# Patient Record
Sex: FEMALE | Race: WHITE | Employment: OTHER | URBAN - METROPOLITAN AREA
[De-identification: names, ages, dates, MRNs, and addresses within clinical notes are randomized per-mention and may not be internally consistent; named-entity substitution may affect disease eponyms.]

---

## 2022-07-09 ENCOUNTER — APPOINTMENT (OUTPATIENT)
Dept: CT IMAGING | Age: 27
End: 2022-07-09
Payer: MEDICARE

## 2022-07-09 ENCOUNTER — HOSPITAL ENCOUNTER (EMERGENCY)
Age: 27
Discharge: HOME | End: 2022-07-09
Attending: EMERGENCY MEDICINE
Payer: MEDICARE

## 2022-07-09 VITALS
TEMPERATURE: 97.2 F | HEIGHT: 62 IN | BODY MASS INDEX: 24.84 KG/M2 | OXYGEN SATURATION: 100 % | RESPIRATION RATE: 20 BRPM | DIASTOLIC BLOOD PRESSURE: 81 MMHG | HEART RATE: 98 BPM | SYSTOLIC BLOOD PRESSURE: 121 MMHG | WEIGHT: 135 LBS

## 2022-07-09 DIAGNOSIS — R56.9 SEIZURE (HCC): ICD-10-CM

## 2022-07-09 DIAGNOSIS — S01.01XA LACERATION OF SCALP, INITIAL ENCOUNTER: Primary | ICD-10-CM

## 2022-07-09 LAB
A/G RATIO: 1.7 (ref 1.1–2.2)
ALBUMIN SERPL-MCNC: 4.7 G/DL (ref 3.4–5)
ALP BLD-CCNC: 65 U/L (ref 40–129)
ALT SERPL-CCNC: 14 U/L (ref 10–40)
ANION GAP SERPL CALCULATED.3IONS-SCNC: 9 MMOL/L (ref 3–16)
AST SERPL-CCNC: 28 U/L (ref 15–37)
BASOPHILS ABSOLUTE: 0 K/UL (ref 0–0.2)
BASOPHILS RELATIVE PERCENT: 0.7 %
BILIRUB SERPL-MCNC: <0.2 MG/DL (ref 0–1)
BUN BLDV-MCNC: 16 MG/DL (ref 7–20)
CALCIUM SERPL-MCNC: 10.3 MG/DL (ref 8.3–10.6)
CHLORIDE BLD-SCNC: 104 MMOL/L (ref 99–110)
CO2: 30 MMOL/L (ref 21–32)
CREAT SERPL-MCNC: 0.5 MG/DL (ref 0.6–1.1)
EOSINOPHILS ABSOLUTE: 0.3 K/UL (ref 0–0.6)
EOSINOPHILS RELATIVE PERCENT: 4.9 %
GFR AFRICAN AMERICAN: >60
GFR NON-AFRICAN AMERICAN: >60
GLUCOSE BLD-MCNC: 109 MG/DL (ref 70–99)
HCT VFR BLD CALC: 39.8 % (ref 36–48)
HEMOGLOBIN: 12.8 G/DL (ref 12–16)
LYMPHOCYTES ABSOLUTE: 2 K/UL (ref 1–5.1)
LYMPHOCYTES RELATIVE PERCENT: 37.3 %
MCH RBC QN AUTO: 29.6 PG (ref 26–34)
MCHC RBC AUTO-ENTMCNC: 32.2 G/DL (ref 31–36)
MCV RBC AUTO: 92.2 FL (ref 80–100)
MONOCYTES ABSOLUTE: 0.5 K/UL (ref 0–1.3)
MONOCYTES RELATIVE PERCENT: 10.1 %
NEUTROPHILS ABSOLUTE: 2.5 K/UL (ref 1.7–7.7)
NEUTROPHILS RELATIVE PERCENT: 47 %
PDW BLD-RTO: 14.3 % (ref 12.4–15.4)
PLATELET # BLD: 190 K/UL (ref 135–450)
PMV BLD AUTO: 9.1 FL (ref 5–10.5)
POTASSIUM SERPL-SCNC: 4.3 MMOL/L (ref 3.5–5.1)
RBC # BLD: 4.32 M/UL (ref 4–5.2)
SODIUM BLD-SCNC: 143 MMOL/L (ref 136–145)
TOTAL PROTEIN: 7.4 G/DL (ref 6.4–8.2)
VALPROIC ACID LEVEL: 43.8 UG/ML (ref 50–100)
WBC # BLD: 5.4 K/UL (ref 4–11)

## 2022-07-09 PROCEDURE — 12004 RPR S/N/AX/GEN/TRK7.6-12.5CM: CPT

## 2022-07-09 PROCEDURE — 90715 TDAP VACCINE 7 YRS/> IM: CPT | Performed by: EMERGENCY MEDICINE

## 2022-07-09 PROCEDURE — 96372 THER/PROPH/DIAG INJ SC/IM: CPT

## 2022-07-09 PROCEDURE — 72125 CT NECK SPINE W/O DYE: CPT

## 2022-07-09 PROCEDURE — 80053 COMPREHEN METABOLIC PANEL: CPT

## 2022-07-09 PROCEDURE — 6360000002 HC RX W HCPCS: Performed by: EMERGENCY MEDICINE

## 2022-07-09 PROCEDURE — 80164 ASSAY DIPROPYLACETIC ACD TOT: CPT

## 2022-07-09 PROCEDURE — 70450 CT HEAD/BRAIN W/O DYE: CPT

## 2022-07-09 PROCEDURE — 2500000003 HC RX 250 WO HCPCS

## 2022-07-09 PROCEDURE — 99285 EMERGENCY DEPT VISIT HI MDM: CPT

## 2022-07-09 PROCEDURE — 90471 IMMUNIZATION ADMIN: CPT | Performed by: EMERGENCY MEDICINE

## 2022-07-09 PROCEDURE — 85025 COMPLETE CBC W/AUTO DIFF WBC: CPT

## 2022-07-09 RX ORDER — RISPERIDONE 2 MG/1
2 TABLET, FILM COATED ORAL 2 TIMES DAILY
COMMUNITY

## 2022-07-09 RX ORDER — KETAMINE HYDROCHLORIDE 50 MG/ML
5 INJECTION, SOLUTION, CONCENTRATE INTRAMUSCULAR; INTRAVENOUS ONCE
Status: COMPLETED | OUTPATIENT
Start: 2022-07-09 | End: 2022-07-09

## 2022-07-09 RX ORDER — HALOPERIDOL 5 MG/ML
5 INJECTION INTRAMUSCULAR ONCE
Status: COMPLETED | OUTPATIENT
Start: 2022-07-09 | End: 2022-07-09

## 2022-07-09 RX ORDER — AMITRIPTYLINE HYDROCHLORIDE 100 MG/1
50 TABLET, FILM COATED ORAL NIGHTLY
COMMUNITY
Start: 2022-04-16

## 2022-07-09 RX ORDER — KETAMINE HYDROCHLORIDE 50 MG/ML
INJECTION, SOLUTION, CONCENTRATE INTRAMUSCULAR; INTRAVENOUS
Status: COMPLETED
Start: 2022-07-09 | End: 2022-07-09

## 2022-07-09 RX ORDER — DIVALPROEX SODIUM 125 MG/1
725 CAPSULE, COATED PELLETS ORAL 2 TIMES DAILY
COMMUNITY

## 2022-07-09 RX ORDER — MIDAZOLAM HYDROCHLORIDE 5 MG/ML
10 INJECTION INTRAMUSCULAR; INTRAVENOUS ONCE
Status: COMPLETED | OUTPATIENT
Start: 2022-07-09 | End: 2022-07-09

## 2022-07-09 RX ORDER — ETHOSUXIMIDE 250 MG/5ML
20 SOLUTION ORAL 2 TIMES DAILY
COMMUNITY

## 2022-07-09 RX ORDER — LIDOCAINE HYDROCHLORIDE AND EPINEPHRINE BITARTRATE 20; .01 MG/ML; MG/ML
INJECTION, SOLUTION SUBCUTANEOUS
Status: DISCONTINUED
Start: 2022-07-09 | End: 2022-07-10 | Stop reason: HOSPADM

## 2022-07-09 RX ADMIN — HALOPERIDOL LACTATE 5 MG: 5 INJECTION, SOLUTION INTRAMUSCULAR at 18:23

## 2022-07-09 RX ADMIN — KETAMINE HYDROCHLORIDE 305 MG: 50 INJECTION, SOLUTION INTRAMUSCULAR; INTRAVENOUS at 19:52

## 2022-07-09 RX ADMIN — TETANUS TOXOID, REDUCED DIPHTHERIA TOXOID AND ACELLULAR PERTUSSIS VACCINE, ADSORBED 0.5 ML: 5; 2.5; 8; 8; 2.5 SUSPENSION INTRAMUSCULAR at 18:26

## 2022-07-09 RX ADMIN — KETAMINE HYDROCHLORIDE 305 MG: 50 INJECTION, SOLUTION, CONCENTRATE INTRAMUSCULAR; INTRAVENOUS at 19:52

## 2022-07-09 RX ADMIN — MIDAZOLAM HYDROCHLORIDE 10 MG: 5 INJECTION, SOLUTION INTRAMUSCULAR; INTRAVENOUS at 18:21

## 2022-07-09 ASSESSMENT — PAIN DESCRIPTION - PAIN TYPE: TYPE: ACUTE PAIN

## 2022-07-09 ASSESSMENT — PAIN - FUNCTIONAL ASSESSMENT: PAIN_FUNCTIONAL_ASSESSMENT: WONG-BAKER FACES

## 2022-07-09 ASSESSMENT — PAIN DESCRIPTION - LOCATION: LOCATION: HEAD

## 2022-07-09 NOTE — ED NOTES
Patient returned to room. Bed locked and in lowest position. Patient placed on continuous telemetry, bp, as well as pulse ox. Call light within reach. Family at bedside.       Youusf Begum RN  07/09/22 8840

## 2022-07-09 NOTE — SEDATION DOCUMENTATION
Conscious sedation began. MD, PA, RT, Magan Duque RN, Bennett Tong RN, Jillian Lion RN at bedside. Respiratory cart placed outside of room. Patient being monitored on continuous telemetry, pulse ox, as well as bp cuff. Consent obtained and placed on patient chart. Time our preformed bedside.

## 2022-07-10 NOTE — ED NOTES
Patient returned to baseline per family. MD at bedside to evaluate patient.       Rey Stephen RN  07/09/22 8862

## 2022-07-10 NOTE — ED PROVIDER NOTES
In addition to the advanced practice provider, I personally saw Lamar Velasquez and performed a substantive portion of the visit including all aspects of the medical decision making. Briefly, this is a 33 y.o. female here for laceration to her right scalp. Patient presents in care of her mother and father. Patient with history of Angelman syndrome and seizure disorder, nonverbal at baseline and unable to contribute to history. Family states at home just prior to arrival he heard a crash and found the patient on the floor with bleeding from her right scalp. There was blood on a door frame, family believes the patient have a seizure and fell against the door frame. At time of arrival to the emergency department, they report the patient seems as though she is in pain however is otherwise acting normally. There have been no fevers, vomiting, trouble breathing or other preceding symptoms of concern. Patient has been adherent to all her medications. On exam, patient afebrile and nontoxic. No overt painful or respiratory distress. Heart borderline tachycardic, regular rhythm. Lungs CTAB. Abdomen soft, nondistended, no distress elicited with deep palpation all quadrants. Alert, nonverbal, does not follow commands. Moves all extremities spontaneously and is interactive with examiner. 10 cm gaping laceration along right frontoparietal scalp, bleeding currently well controlled. Procedures    Conscious Sedation Procedure Note    Indication: laceration repair    Consent: I have discussed with the patient and/or the patient representative the indication, alternatives, and the possible risks and/or complications of the planned procedure and the anesthesia methods. The patient and/or patient representative appear to understand and agree to proceed. Physician Involvement: The attending physician was present and supervising this procedure.     Pre-Sedation Documentation and Exam: I have personally completed a home.  Patient unable to cooperate with examination, continues to move and push examiners away, family reports patient typically requires sedation for any procedure intervention or examination. After full discussion of risks and benefits and signed consent, patient underwent sedation with intramuscular ketamine to good effect. Laceration was irrigated and repaired without complication. CT head and cervical spine were performed and showed no evidence of hemorrhage, mass-effect, osseous abnormality or other concerning finding. Patient was further observed in the emergency department, family reports the patient has returned to her baseline and feel comfortable returning to home with her. Strict return precautions were discussed at length. I Dr. Nick Sneed am the primary clinician of record. Patient Referrals:  Her primary care physician    In 10 days  for staple removal      Discharge Medications:  Discharge Medication List as of 7/9/2022  9:52 PM          FINAL IMPRESSION  1. Laceration of scalp, initial encounter    2. Seizure ([address]        Blood pressure 121/81, pulse 98, temperature 97.2 °F (36.2 °C), temperature source Axillary, resp. rate 20, height 5' 2\" (1.575 m), weight 135 lb (61.2 kg), SpO2 100 %. For further details of [facility] emergency department encounter, please see documentation by advanced practice provider, YIMI Conrad.     Fady Kee DO (electronically signed)  Attending Emergency Physician       Fady Kee DO  07/10/22 9740

## 2022-07-10 NOTE — ED PROVIDER NOTES
[facility]        Pt Name: Anton Szymanski  MRN: [id number]  [place] 1995  Date of evaluation: 7/9/2022  Provider: Kwadwo Galindo PA-C  PCP: No primary care provider on file. Note Started: 9:37 PM EDT        I have seen and evaluated this patient with my supervising physician Boby Estrada, [facility]       Chief Complaint   Patient presents with    Head Laceration     arrived per  EMS from home d/t a seizure with a 2 inch laceration; pt is MR; /110-105-96% RA; takes depakote and 1 other medication       HISTORY OF PRESENT ILLNESS   (Location, Timing/Onset, Context/Setting, Quality, Duration, Modifying Factors, Severity, Associated Signs and Symptoms)  Note limiting factors. Chief Complaint: Seizure, head laceration,    Anton Szymanski is a 33 y.o. female who presents to the emergency department today with mom, and dad, for evaluation for a seizure, as well as a scalp laceration. The patient has a past medical history of seizures, as well as Angelman syndrome. Patient is otherwise compliant with her medication. Family reports that they heard a thud, and noticed that there was blood on the doorway and that the patient had a laceration to her scalp. Mom does suspect that she had a seizure. The patient arrives to the ED, she is otherwise acting at baseline. The family states that the patient's tetanus is not up-to-date. Family reports that the patient usually needs to be sedated for procedures. No reports of any recent illnesses, patient otherwise is nonverbal, acting at baseline, no other history is able to be obtained at this time. Nursing Notes were all reviewed and agreed with or any disagreements were addressed in the HPI.     REVIEW OF SYSTEMS    (2-9 systems for level 4, 10 or more for level 5)     Review of Systems   Unable to perform ROS: Patient nonverbal       Positives and Pertinent negatives as per HPI. Except as noted above in the ROS, all other systems were reviewed and negative. PAST MEDICAL HISTORY     Past Medical History:   Diagnosis Date    Seizures [facility])          SURGICAL HISTORY   History reviewed. No pertinent surgical history. CURRENTMEDICATIONS       Previous Medications    AMITRIPTYLINE (ELAVIL) 100 MG TABLET    50 mg at bedtime    DIVALPROEX (DEPAKOTE SPRINKLE) 125 MG CAPSULE    Take 725 mg by mouth 2 times daily    ETHOSUXIMIDE (ZARONTIN) 250 MG/5ML SOLUTION    Take 20 mg/kg by mouth 2 times daily 15 ml BID    RISPERIDONE (RISPERDAL) 2 MG TABLET    Take 2 mg by mouth 2 times daily         ALLERGIES     Patient has no known allergies. FAMILYHISTORY     History reviewed. No pertinent family history. SOCIAL HISTORY       Social History     Tobacco Use    Smoking status: Never Smoker    Smokeless tobacco: Never Used   Vaping Use    Vaping Use: Never used   Substance Use Topics    Alcohol use: Never    Drug use: Never       SCREENINGS    Sanjeev Coma Scale  Eye Opening: Spontaneous  Best Verbal Response: Inappropriate words  Best Motor Response: Localizes pain  Sanjeev Coma Scale Score: 12        PHYSICAL EXAM    (up to 7 for level 4, 8 or more for level 5)     ED Triage Vitals [07/09/22 1735]   BP Temp Temp Source Heart Rate Resp SpO2 Height Weight   (!) 127/102 97.2 °F (36.2 °C) Axillary 76 20 99 % 5' 2\" (1.575 m) 135 lb (61.2 kg)       Physical Exam  Vitals and nursing note reviewed. Constitutional:       Appearance: She is well-developed. She is not diaphoretic. HENT:      Head: Normocephalic and atraumatic. Comments: To the right side of the temporal scalp, there is a 10 cm laceration noted, gaping, approximates well. There is no ecchymosis, bogginess, bony step-offs or crepitus. Right Ear: External ear normal.      Left Ear: External ear normal.      Nose: Nose normal.   Eyes:      General:         Right eye: No discharge.          Left eye: No discharge. Extraocular Movements: Extraocular movements intact. Conjunctiva/sclera: Conjunctivae normal.      Pupils: Pupils are equal, round, and reactive to light. Neck:      Trachea: No tracheal deviation. Cardiovascular:      Rate and Rhythm: Normal rate and regular rhythm. Pulmonary:      Effort: Pulmonary effort is normal. No respiratory distress. Breath sounds: Normal breath sounds. No wheezing. Musculoskeletal:         General: Normal range of motion. Cervical back: Normal range of motion and neck supple. Comments: Moving all extremities without any difficulty   Skin:     General: Skin is warm and dry. Neurological:      General: No focal deficit present. Mental Status: She is alert. Mental status is at baseline. Psychiatric:         Behavior: Behavior normal.         DIAGNOSTIC RESULTS   LABS:    Labs Reviewed   COMPREHENSIVE METABOLIC PANEL - Abnormal; Notable for the following components:       Result Value    Glucose 109 (*)     CREATININE 0.5 (*)     All other components within normal limits   VALPROIC ACID LEVEL, TOTAL - Abnormal; Notable for the following components:    Valproic Acid Lvl 43.8 (*)     All other components within normal limits   CBC WITH AUTO DIFFERENTIAL   URINALYSIS WITH REFLEX TO CULTURE       When ordered only abnormal lab results are displayed. All other labs were within normal range or not returned as of this dictation. EKG: When ordered, EKG's are interpreted by the Emergency Department Physician in the absence of a cardiologist.  Please see their note for interpretation of EKG.     RADIOLOGY:   Non-plain film images such as CT, Ultrasound and MRI are read by the radiologist. Plain radiographic images are visualized and preliminarily interpreted by the ED Provider with the below findings:        Interpretation per the Radiologist below, if available at the time of this note:    CT Cervical Spine WO Contrast   Final Result   Head CT: No acute intracranial abnormality. No evidence for acute   intracranial hemorrhage, territorial infarction or intracranial mass lesion. Cervical CT: No acute abnormality of the cervical spine. No fracture. CT Head WO Contrast   Final Result   Head CT: No acute intracranial abnormality. No evidence for acute   intracranial hemorrhage, territorial infarction or intracranial mass lesion. Cervical CT: No acute abnormality of the cervical spine. No fracture. CT Head WO Contrast    Result Date: 7/9/2022  EXAMINATION: CT OF THE HEAD WITHOUT CONTRAST; CT OF THE CERVICAL SPINE WITHOUT CONTRAST 7/9/2022 6:48 pm TECHNIQUE: CT of the head was performed without the administration of intravenous contrast. Automated exposure control, iterative reconstruction, and/or weight based adjustment of the mA/kV was utilized to reduce the radiation dose to as low as reasonably achievable.; CT of the cervical spine was performed without the administration of intravenous contrast. Multiplanar reformatted images are provided for review. Automated exposure control, iterative reconstruction, and/or weight based adjustment of the mA/kV was utilized to reduce the radiation dose to as low as reasonably achievable. COMPARISON: None. HISTORY: ORDERING SYSTEM PROVIDED HISTORY: closed head injury, right frontoparietal scalp laceration TECHNOLOGIST PROVIDED HISTORY: Reason for exam:->closed head injury, right frontoparietal scalp laceration Has a \"code stroke\" or \"stroke alert\" been called? ->No Decision Support Exception - unselect if not a suspected or confirmed emergency medical condition->Emergency Medical Condition (MA) Is the patient pregnant?->No Reason for Exam: Head Laceration (arrived per  EMS from home d/t a seizure with a 2 inch laceration; pt is MR; /110-105-96% RA; takes depakote and 1 other medication FINDINGS: Head CT: There is no acute infarction, intracranial hemorrhage or intracranial mass lesion. No mass effect, midline shift or extra-axial collection is noted. The brain parenchyma is normal. The cerebellar tonsils are in normal position. The ventricles, sulci, and cisterns are within normal limits in size and configuration. The globes and orbits are within normal limits. The visualized extracranial structures including paranasal sinuses and mastoid air cells are unremarkable. No fracture is identified. Right frontal subcutaneous soft tissue hematoma and emphysema. Cervical spine CT: BONES/ALIGNMENT: There is no acute fracture or traumatic malalignment. DEGENERATIVE CHANGES: No significant degenerative changes. SOFT TISSUES: There is no prevertebral soft tissue swelling. Head CT: No acute intracranial abnormality. No evidence for acute intracranial hemorrhage, territorial infarction or intracranial mass lesion. Cervical CT: No acute abnormality of the cervical spine. No fracture. CT Cervical Spine WO Contrast    Result Date: 7/9/2022  EXAMINATION: CT OF THE HEAD WITHOUT CONTRAST; CT OF THE CERVICAL SPINE WITHOUT CONTRAST 7/9/2022 6:48 pm TECHNIQUE: CT of the head was performed without the administration of intravenous contrast. Automated exposure control, iterative reconstruction, and/or weight based adjustment of the mA/kV was utilized to reduce the radiation dose to as low as reasonably achievable.; CT of the cervical spine was performed without the administration of intravenous contrast. Multiplanar reformatted images are provided for review. Automated exposure control, iterative reconstruction, and/or weight based adjustment of the mA/kV was utilized to reduce the radiation dose to as low as reasonably achievable. COMPARISON: None. HISTORY: ORDERING SYSTEM PROVIDED HISTORY: closed head injury, right frontoparietal scalp laceration TECHNOLOGIST PROVIDED HISTORY: Reason for exam:->closed head injury, right frontoparietal scalp laceration Has a \"code stroke\" or \"stroke alert\" been called? ->No Intermediate  Pre-procedure details:     Preparation:  Patient was prepped and draped in usual sterile fashion and imaging obtained to evaluate for foreign bodies  Exploration:     Hemostasis achieved with:  Direct pressure and epinephrine    Wound extent: no foreign bodies/material noted, no muscle damage noted, no underlying fracture noted and no vascular damage noted      Contaminated: no    Treatment:     Area cleansed with:  Saline    Amount of cleaning:  Extensive    Irrigation solution:  Sterile saline    Irrigation method:  Syringe    Visualized foreign bodies/material removed: no    Subcutaneous repair:     Suture size:  5-0    Suture material:  Chromic gut    Suture technique:  Simple interrupted    Number of sutures:  4  Skin repair:     Repair method:  Staples    Number of staples:  14  Approximation:     Approximation:  Close  Post-procedure details:     Dressing:  Antibiotic ointment    Patient tolerance of procedure:   Tolerated well, no immediate complications        CRITICAL CARE TIME       CONSULTS:  None      EMERGENCY DEPARTMENT COURSE and DIFFERENTIAL DIAGNOSIS/MDM:   Vitals:    Vitals:    07/09/22 1924 07/09/22 1926 07/09/22 1947 07/09/22 1947   BP: 107/69 114/88 109/85 115/83   Pulse: 96 (!) 104  98   Resp: 18 24  20   Temp:       TempSrc:       SpO2: 100% 95% 98% 98%   Weight:       Height:           Patient was given the following medications:  Medications   lidocaine-EPINEPHrine 2 percent-1:003586 injection (has no administration in time range)   midazolam HCl (VERSED) 10 MG/2ML injection 10 mg (10 mg IntraMUSCular Given 7/9/22 1821)   haloperidol lactate (HALDOL) injection 5 mg (5 mg IntraMUSCular Given 7/9/22 1823)   Tetanus-Diphth-Acell Pertussis (BOOSTRIX) injection 0.5 mL (0.5 mLs IntraMUSCular Given 7/9/22 1826)   ketamine (KETALAR) injection 305 mg (305 mg IntraMUSCular Given 7/9/22 1952)         Is this patient to be included in the SEP-1 Core Measure due to severe sepsis or septic shock? No   Exclusion criteria - the patient is NOT to be included for SEP-1 Core Measure due to: Infection is not suspected    Briefly, this is a 80-year-old female, history of seizures, Angelman syndrome, presents today with mom and dad for concern of a right scalp laceration. Family believes that she did have a seizure. When she arrived she is awake, alert otherwise acting at baseline. On examination she does have a 10 cm laceration noted to the right scalp. This area was repaired with 14 staples by myself, please see procedure note above for additional details. In order to repair this, patient was given Haldol, with no improvement, family did consent to conscious sedation and ketamine. She tolerated this without difficulty. Lab work is otherwise unremarkable, and CT of head and cervical spine are negative. Patient was continually monitored after receiving ketamine, she is awake, alert, back to her baseline, and therefore is reasonable for family to take home for discharge. Supportive care discussed at home, including staple removal within the next 7 to 10 days.   She is to return to the ED for any new or worsening symptoms, family voiced understanding is agreeable plan, stable for discharge    Results for orders placed or performed during the hospital encounter of 07/09/22   CBC with Auto Differential   Result Value Ref Range    WBC 5.4 4.0 - 11.0 K/uL    RBC 4.32 4.00 - 5.20 M/uL    Hemoglobin 12.8 12.0 - 16.0 g/dL    Hematocrit 39.8 36.0 - 48.0 %    MCV 92.2 80.0 - 100.0 fL    MCH 29.6 26.0 - 34.0 pg    MCHC 32.2 31.0 - 36.0 g/dL    RDW 14.3 12.4 - 15.4 %    Platelets 350 105 - 230 K/uL    MPV 9.1 5.0 - 10.5 fL    Neutrophils % 47.0 %    Lymphocytes % 37.3 %    Monocytes % 10.1 %    Eosinophils % 4.9 %    Basophils % 0.7 %    Neutrophils Absolute 2.5 1.7 - 7.7 K/uL    Lymphocytes Absolute 2.0 1.0 - 5.1 K/uL    Monocytes Absolute 0.5 0.0 - 1.3 K/uL    Eosinophils Absolute 0.3 0.0 - 0.6 K/uL mis-transcribed.)    Matt Coronel PA-C (electronically signed)            Matt Coronel PA-C  07/09/22 [id number]

## 2022-07-10 NOTE — ED NOTES
Discharge and education instructions reviewed. Patient verbalized understanding, teach-back successful. Patient denied questions at this time. No acute distress noted. Patient instructed to follow-up as noted - return to emergency department if symptoms worsen. Patient verbalized understanding. Discharged per EDMD with discharged instructions.      Som Gayle RN  07/09/22 5945